# Patient Record
Sex: FEMALE | Race: WHITE | URBAN - METROPOLITAN AREA
[De-identification: names, ages, dates, MRNs, and addresses within clinical notes are randomized per-mention and may not be internally consistent; named-entity substitution may affect disease eponyms.]

---

## 2023-09-14 ENCOUNTER — OFFICE VISIT (OUTPATIENT)
Age: 28
End: 2023-09-14

## 2023-09-14 VITALS
TEMPERATURE: 98.3 F | DIASTOLIC BLOOD PRESSURE: 74 MMHG | HEART RATE: 82 BPM | HEIGHT: 59 IN | SYSTOLIC BLOOD PRESSURE: 112 MMHG | WEIGHT: 135 LBS | OXYGEN SATURATION: 98 % | BODY MASS INDEX: 27.21 KG/M2

## 2023-09-14 DIAGNOSIS — H66.91 RIGHT OTITIS MEDIA, UNSPECIFIED OTITIS MEDIA TYPE: Primary | ICD-10-CM

## 2023-09-14 RX ORDER — CLINDAMYCIN HYDROCHLORIDE 300 MG/1
300 CAPSULE ORAL 3 TIMES DAILY
Qty: 21 CAPSULE | Refills: 0 | Status: SHIPPED | OUTPATIENT
Start: 2023-09-14 | End: 2023-09-21

## 2023-09-14 ASSESSMENT — ENCOUNTER SYMPTOMS
SORE THROAT: 0
SINUS PRESSURE: 0
RHINORRHEA: 0
EYE DISCHARGE: 0
SINUS PAIN: 0

## 2024-12-03 ENCOUNTER — OFFICE VISIT (OUTPATIENT)
Age: 29
End: 2024-12-03

## 2024-12-03 VITALS
OXYGEN SATURATION: 98 % | BODY MASS INDEX: 28.48 KG/M2 | TEMPERATURE: 97.6 F | HEART RATE: 89 BPM | RESPIRATION RATE: 16 BRPM | DIASTOLIC BLOOD PRESSURE: 76 MMHG | WEIGHT: 141 LBS | SYSTOLIC BLOOD PRESSURE: 114 MMHG

## 2024-12-03 DIAGNOSIS — J01.90 ACUTE SINUSITIS, RECURRENCE NOT SPECIFIED, UNSPECIFIED LOCATION: Primary | ICD-10-CM

## 2024-12-03 RX ORDER — AZITHROMYCIN 250 MG/1
250 TABLET, FILM COATED ORAL SEE ADMIN INSTRUCTIONS
Qty: 6 TABLET | Refills: 0 | Status: SHIPPED | OUTPATIENT
Start: 2024-12-03 | End: 2024-12-08

## 2024-12-03 RX ORDER — PREDNISONE 20 MG/1
40 TABLET ORAL DAILY
Qty: 10 TABLET | Refills: 0 | Status: SHIPPED | OUTPATIENT
Start: 2024-12-03 | End: 2024-12-08

## 2024-12-03 ASSESSMENT — ENCOUNTER SYMPTOMS
SORE THROAT: 1
VOMITING: 0
NAUSEA: 0
VOICE CHANGE: 1
ABDOMINAL PAIN: 0
SHORTNESS OF BREATH: 0
RHINORRHEA: 1
DIARRHEA: 0
COUGH: 1
SINUS PRESSURE: 1
WHEEZING: 0

## 2024-12-03 NOTE — PROGRESS NOTES
Jacque Hoffmann (:  1995) is a 29 y.o. female,Established patient, here for evaluation of the following chief complaint(s):  Pharyngitis      ASSESSMENT/PLAN:  1. Acute sinusitis, recurrence not specified, unspecified location    - azithromycin (ZITHROMAX) 250 MG tablet; Take 1 tablet by mouth See Admin Instructions for 5 days 500mg on day 1 followed by 250mg on days 2 - 5  Dispense: 6 tablet; Refill: 0  - predniSONE (DELTASONE) 20 MG tablet; Take 2 tablets by mouth daily for 5 days  Dispense: 10 tablet; Refill: 0     -increase fluid intake,take Tylenol as needed.  Return if symptoms worsen or fail to improve.    SUBJECTIVE/OBJECTIVE:    History provided by:  Patient  Pharyngitis  Onset quality:  Gradual  Duration:  4 days  Timing:  Intermittent  Progression:  Worsening  Chronicity:  New  Associated symptoms: congestion, cough, headaches, rhinorrhea and sore throat    Associated symptoms: no abdominal pain, no chest pain, no diarrhea, no ear pain, no fatigue, no fever, no myalgias, no nausea, no rash, no shortness of breath, no vomiting and no wheezing        Vitals:    24 0853   BP: 114/76   Pulse: 89   Resp: 16   Temp: 97.6 °F (36.4 °C)   TempSrc: Oral   SpO2: 98%   Weight: 64 kg (141 lb)       Review of Systems   Constitutional:  Negative for activity change, appetite change, fatigue and fever.   HENT:  Positive for congestion, rhinorrhea, sinus pressure, sore throat and voice change (horse voice). Negative for ear pain and sneezing.    Respiratory:  Positive for cough. Negative for shortness of breath and wheezing.    Cardiovascular:  Negative for chest pain.   Gastrointestinal:  Negative for abdominal pain, diarrhea, nausea and vomiting.   Musculoskeletal:  Negative for myalgias.   Skin:  Negative for rash.   Neurological:  Positive for headaches.       Physical Exam  Constitutional:       General: She is not in acute distress.  HENT:      Nose: Congestion (with sinus tenderness) present.